# Patient Record
Sex: MALE | URBAN - METROPOLITAN AREA
[De-identification: names, ages, dates, MRNs, and addresses within clinical notes are randomized per-mention and may not be internally consistent; named-entity substitution may affect disease eponyms.]

---
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
## 2021-10-18 ASSESSMENT — TONOMETRY
OS_IOP_MMHG: 14
OS_IOP_MMHG: 12
OD_IOP_MMHG: 12

## 2021-10-18 ASSESSMENT — VISUAL ACUITY
OD_SC: 20/20 SN
OS_SC: 20/20 - SN
OD_SC: 20/30 SN
OS_SC: 20/30 SN